# Patient Record
Sex: FEMALE | Race: OTHER | HISPANIC OR LATINO | URBAN - METROPOLITAN AREA
[De-identification: names, ages, dates, MRNs, and addresses within clinical notes are randomized per-mention and may not be internally consistent; named-entity substitution may affect disease eponyms.]

---

## 2019-06-15 ENCOUNTER — OUTPATIENT (OUTPATIENT)
Dept: INPATIENT UNIT | Facility: HOSPITAL | Age: 29
LOS: 1 days | End: 2019-06-15
Payer: SELF-PAY

## 2019-06-15 VITALS
DIASTOLIC BLOOD PRESSURE: 79 MMHG | SYSTOLIC BLOOD PRESSURE: 128 MMHG | TEMPERATURE: 98 F | RESPIRATION RATE: 16 BRPM | HEART RATE: 108 BPM

## 2019-06-15 VITALS
DIASTOLIC BLOOD PRESSURE: 72 MMHG | HEART RATE: 100 BPM | RESPIRATION RATE: 16 BRPM | SYSTOLIC BLOOD PRESSURE: 126 MMHG | TEMPERATURE: 99 F

## 2019-06-15 DIAGNOSIS — O47.03 FALSE LABOR BEFORE 37 COMPLETED WEEKS OF GESTATION, THIRD TRIMESTER: ICD-10-CM

## 2019-06-15 PROCEDURE — 59025 FETAL NON-STRESS TEST: CPT

## 2019-06-15 PROCEDURE — 59025 FETAL NON-STRESS TEST: CPT | Mod: 26

## 2019-06-15 PROCEDURE — G0463: CPT

## 2019-06-15 PROCEDURE — 59050 FETAL MONITOR W/REPORT: CPT

## 2019-06-15 NOTE — OB PROVIDER TRIAGE NOTE - NSHPPHYSICALEXAM_GEN_ALL_CORE
Gen: NAD  ABd: soft, gravid  FH: reactive  [place]: q7-8min contractions  SVE: closed/long/posterior

## 2019-06-15 NOTE — OB PROVIDER TRIAGE NOTE - HISTORY OF PRESENT ILLNESS
27yo  @ 34.6w who comes in with a complaint of contractions that started yesterday but has increased in frequency today to every 10 minutes. Pt describes the pain as a colicky pain that occurs once in a while. Pt denies any vaginal bleeding, loss of fluids. Pt reports +FM. PT denies any sex in the last 48 hours and says she only has had 3 bottles of water a day.

## 2019-06-15 NOTE — OB PROVIDER TRIAGE NOTE - NSOBPROVIDERNOTE_OBGYN_ALL_OB_FT
27yo not in labor. PT likely dehydrated. Counseled on proper PO hydration. Counseled on symptoms which would require a return to triage. Pt understood. Pt to be discharged. Plan/tracing reviewed with Dr. Paredes

## 2023-01-14 NOTE — OB RN TRIAGE NOTE - NS_NUMBOFVISITS_OBGYN_ALL_OB_NU
[FreeTextEntry1] : discussed depression symptoms in detail with patient\par \par wife on zoloft would like to try that for patient first \par discussed AE \par \par will f/u in 4-6 weeks for followup \par referred to  for evaluation. \par pt is deaf, would like in person therapist however most therapists they are finding are only tele-med\par would like assistance finding in person therapist if possible  10

## 2023-08-24 ENCOUNTER — EMERGENCY (EMERGENCY)
Facility: HOSPITAL | Age: 33
LOS: 1 days | Discharge: HOME | End: 2023-08-24
Attending: STUDENT IN AN ORGANIZED HEALTH CARE EDUCATION/TRAINING PROGRAM
Payer: MEDICAID

## 2023-08-24 VITALS
RESPIRATION RATE: 18 BRPM | OXYGEN SATURATION: 98 % | WEIGHT: 141.1 LBS | TEMPERATURE: 98 F | HEART RATE: 82 BPM | SYSTOLIC BLOOD PRESSURE: 124 MMHG | DIASTOLIC BLOOD PRESSURE: 85 MMHG

## 2023-08-24 PROBLEM — O03.9 COMPLETE OR UNSPECIFIED SPONTANEOUS ABORTION WITHOUT COMPLICATION: Chronic | Status: ACTIVE | Noted: 2019-06-15

## 2023-08-24 LAB
ALBUMIN SERPL ELPH-MCNC: 4.2 G/DL — SIGNIFICANT CHANGE UP (ref 3.3–5.2)
ALP SERPL-CCNC: 105 U/L — SIGNIFICANT CHANGE UP (ref 40–120)
ALT FLD-CCNC: 9 U/L — SIGNIFICANT CHANGE UP
ANION GAP SERPL CALC-SCNC: 12 MMOL/L — SIGNIFICANT CHANGE UP (ref 5–17)
APPEARANCE UR: ABNORMAL
AST SERPL-CCNC: 13 U/L — SIGNIFICANT CHANGE UP
BACTERIA # UR AUTO: ABNORMAL
BASOPHILS # BLD AUTO: 0.05 K/UL — SIGNIFICANT CHANGE UP (ref 0–0.2)
BASOPHILS NFR BLD AUTO: 0.7 % — SIGNIFICANT CHANGE UP (ref 0–2)
BILIRUB SERPL-MCNC: 0.3 MG/DL — LOW (ref 0.4–2)
BILIRUB UR-MCNC: NEGATIVE — SIGNIFICANT CHANGE UP
BUN SERPL-MCNC: 9.5 MG/DL — SIGNIFICANT CHANGE UP (ref 8–20)
CALCIUM SERPL-MCNC: 8.8 MG/DL — SIGNIFICANT CHANGE UP (ref 8.4–10.5)
CHLORIDE SERPL-SCNC: 105 MMOL/L — SIGNIFICANT CHANGE UP (ref 96–108)
CO2 SERPL-SCNC: 24 MMOL/L — SIGNIFICANT CHANGE UP (ref 22–29)
COLOR SPEC: YELLOW — SIGNIFICANT CHANGE UP
CREAT SERPL-MCNC: 0.7 MG/DL — SIGNIFICANT CHANGE UP (ref 0.5–1.3)
DIFF PNL FLD: ABNORMAL
EGFR: 119 ML/MIN/1.73M2 — SIGNIFICANT CHANGE UP
EOSINOPHIL # BLD AUTO: 0.04 K/UL — SIGNIFICANT CHANGE UP (ref 0–0.5)
EOSINOPHIL NFR BLD AUTO: 0.5 % — SIGNIFICANT CHANGE UP (ref 0–6)
EPI CELLS # UR: SIGNIFICANT CHANGE UP
GLUCOSE SERPL-MCNC: 72 MG/DL — SIGNIFICANT CHANGE UP (ref 70–99)
GLUCOSE UR QL: NEGATIVE MG/DL — SIGNIFICANT CHANGE UP
HCG SERPL-ACNC: <4 MIU/ML — SIGNIFICANT CHANGE UP
HCT VFR BLD CALC: 41.5 % — SIGNIFICANT CHANGE UP (ref 34.5–45)
HGB BLD-MCNC: 13.6 G/DL — SIGNIFICANT CHANGE UP (ref 11.5–15.5)
IMM GRANULOCYTES NFR BLD AUTO: 0.3 % — SIGNIFICANT CHANGE UP (ref 0–0.9)
KETONES UR-MCNC: NEGATIVE — SIGNIFICANT CHANGE UP
LEUKOCYTE ESTERASE UR-ACNC: ABNORMAL
LIDOCAIN IGE QN: 22 U/L — SIGNIFICANT CHANGE UP (ref 22–51)
LYMPHOCYTES # BLD AUTO: 1.84 K/UL — SIGNIFICANT CHANGE UP (ref 1–3.3)
LYMPHOCYTES # BLD AUTO: 24.6 % — SIGNIFICANT CHANGE UP (ref 13–44)
MCHC RBC-ENTMCNC: 28.6 PG — SIGNIFICANT CHANGE UP (ref 27–34)
MCHC RBC-ENTMCNC: 32.8 GM/DL — SIGNIFICANT CHANGE UP (ref 32–36)
MCV RBC AUTO: 87.4 FL — SIGNIFICANT CHANGE UP (ref 80–100)
MONOCYTES # BLD AUTO: 0.54 K/UL — SIGNIFICANT CHANGE UP (ref 0–0.9)
MONOCYTES NFR BLD AUTO: 7.2 % — SIGNIFICANT CHANGE UP (ref 2–14)
NEUTROPHILS # BLD AUTO: 4.99 K/UL — SIGNIFICANT CHANGE UP (ref 1.8–7.4)
NEUTROPHILS NFR BLD AUTO: 66.7 % — SIGNIFICANT CHANGE UP (ref 43–77)
NITRITE UR-MCNC: NEGATIVE — SIGNIFICANT CHANGE UP
PH UR: 6.5 — SIGNIFICANT CHANGE UP (ref 5–8)
PLATELET # BLD AUTO: 253 K/UL — SIGNIFICANT CHANGE UP (ref 150–400)
POTASSIUM SERPL-MCNC: 3.6 MMOL/L — SIGNIFICANT CHANGE UP (ref 3.5–5.3)
POTASSIUM SERPL-SCNC: 3.6 MMOL/L — SIGNIFICANT CHANGE UP (ref 3.5–5.3)
PROT SERPL-MCNC: 7.2 G/DL — SIGNIFICANT CHANGE UP (ref 6.6–8.7)
PROT UR-MCNC: 15
RBC # BLD: 4.75 M/UL — SIGNIFICANT CHANGE UP (ref 3.8–5.2)
RBC # FLD: 12.9 % — SIGNIFICANT CHANGE UP (ref 10.3–14.5)
RBC CASTS # UR COMP ASSIST: SIGNIFICANT CHANGE UP /HPF (ref 0–4)
SODIUM SERPL-SCNC: 141 MMOL/L — SIGNIFICANT CHANGE UP (ref 135–145)
SP GR SPEC: 1.01 — SIGNIFICANT CHANGE UP (ref 1.01–1.02)
TROPONIN T SERPL-MCNC: <0.01 NG/ML — SIGNIFICANT CHANGE UP (ref 0–0.06)
UROBILINOGEN FLD QL: NEGATIVE MG/DL — SIGNIFICANT CHANGE UP
WBC # BLD: 7.48 K/UL — SIGNIFICANT CHANGE UP (ref 3.8–10.5)
WBC # FLD AUTO: 7.48 K/UL — SIGNIFICANT CHANGE UP (ref 3.8–10.5)
WBC UR QL: ABNORMAL /HPF (ref 0–5)

## 2023-08-24 PROCEDURE — 71045 X-RAY EXAM CHEST 1 VIEW: CPT | Mod: 26

## 2023-08-24 PROCEDURE — 76705 ECHO EXAM OF ABDOMEN: CPT | Mod: 26

## 2023-08-24 PROCEDURE — 36415 COLL VENOUS BLD VENIPUNCTURE: CPT

## 2023-08-24 PROCEDURE — 87086 URINE CULTURE/COLONY COUNT: CPT

## 2023-08-24 PROCEDURE — 85025 COMPLETE CBC W/AUTO DIFF WBC: CPT

## 2023-08-24 PROCEDURE — 99285 EMERGENCY DEPT VISIT HI MDM: CPT

## 2023-08-24 PROCEDURE — 84702 CHORIONIC GONADOTROPIN TEST: CPT

## 2023-08-24 PROCEDURE — 87186 SC STD MICRODIL/AGAR DIL: CPT

## 2023-08-24 PROCEDURE — 84484 ASSAY OF TROPONIN QUANT: CPT

## 2023-08-24 PROCEDURE — 83690 ASSAY OF LIPASE: CPT

## 2023-08-24 PROCEDURE — T1013: CPT

## 2023-08-24 PROCEDURE — 76705 ECHO EXAM OF ABDOMEN: CPT

## 2023-08-24 PROCEDURE — 99285 EMERGENCY DEPT VISIT HI MDM: CPT | Mod: 25

## 2023-08-24 PROCEDURE — 80053 COMPREHEN METABOLIC PANEL: CPT

## 2023-08-24 PROCEDURE — 96375 TX/PRO/DX INJ NEW DRUG ADDON: CPT

## 2023-08-24 PROCEDURE — 96374 THER/PROPH/DIAG INJ IV PUSH: CPT

## 2023-08-24 PROCEDURE — 71045 X-RAY EXAM CHEST 1 VIEW: CPT

## 2023-08-24 PROCEDURE — 81001 URINALYSIS AUTO W/SCOPE: CPT

## 2023-08-24 RX ORDER — MORPHINE SULFATE 50 MG/1
4 CAPSULE, EXTENDED RELEASE ORAL ONCE
Refills: 0 | Status: DISCONTINUED | OUTPATIENT
Start: 2023-08-24 | End: 2023-08-24

## 2023-08-24 RX ORDER — FAMOTIDINE 10 MG/ML
20 INJECTION INTRAVENOUS ONCE
Refills: 0 | Status: COMPLETED | OUTPATIENT
Start: 2023-08-24 | End: 2023-08-24

## 2023-08-24 RX ORDER — CEPHALEXIN 500 MG
1 CAPSULE ORAL
Qty: 14 | Refills: 0
Start: 2023-08-24 | End: 2023-08-30

## 2023-08-24 RX ORDER — PANTOPRAZOLE SODIUM 20 MG/1
1 TABLET, DELAYED RELEASE ORAL
Qty: 14 | Refills: 0
Start: 2023-08-24 | End: 2023-09-06

## 2023-08-24 RX ORDER — CEFTRIAXONE 500 MG/1
1000 INJECTION, POWDER, FOR SOLUTION INTRAMUSCULAR; INTRAVENOUS ONCE
Refills: 0 | Status: COMPLETED | OUTPATIENT
Start: 2023-08-24 | End: 2023-08-24

## 2023-08-24 RX ORDER — CEFTRIAXONE 500 MG/1
1000 INJECTION, POWDER, FOR SOLUTION INTRAMUSCULAR; INTRAVENOUS ONCE
Refills: 0 | Status: DISCONTINUED | OUTPATIENT
Start: 2023-08-24 | End: 2023-08-24

## 2023-08-24 RX ORDER — FAMOTIDINE 10 MG/ML
20 INJECTION INTRAVENOUS ONCE
Refills: 0 | Status: DISCONTINUED | OUTPATIENT
Start: 2023-08-24 | End: 2023-08-24

## 2023-08-24 RX ADMIN — Medication 30 MILLILITER(S): at 14:32

## 2023-08-24 RX ADMIN — CEFTRIAXONE 1000 MILLIGRAM(S): 500 INJECTION, POWDER, FOR SOLUTION INTRAMUSCULAR; INTRAVENOUS at 15:28

## 2023-08-24 RX ADMIN — FAMOTIDINE 20 MILLIGRAM(S): 10 INJECTION INTRAVENOUS at 14:32

## 2023-08-24 RX ADMIN — MORPHINE SULFATE 4 MILLIGRAM(S): 50 CAPSULE, EXTENDED RELEASE ORAL at 15:58

## 2023-08-24 NOTE — ED STATDOCS - PATIENT PORTAL LINK FT
You can access the FollowMyHealth Patient Portal offered by [facility] by registering at the following website: [email or web address]. By joining [employer]’s FollowMyHealth portal, you will also be able to view your health information using other applications (apps) compatible with our system.

## 2023-08-24 NOTE — ED STATDOCS - CLINICAL SUMMARY MEDICAL DECISION MAKING FREE TEXT BOX
35 y/o female p/w epigastric pain, sometimes gets chest pain at times feels like burning pain but a few days ago felt like a punch. Denies any fever but had chills last night. By hx sounds like reflux, on exam has epigastric pain suspect Gastritis w/ reflux. DDx includes pancreatitis vs atypical ACS, vs PNA. Will check labs, CXR, EKG, start symptomatic treatment w/ medication, f/u studies, reassess and dispo pending clinical course. 33 y/o female p/w epigastric pain, sometimes gets chest pain at times feels like burning pain but a few days ago felt like a punch. Denies any fever but had chills last night. By hx sounds like reflux, on exam has epigastric pain suspect Gastritis w/ reflux. DDx includes pancreatitis vs atypical ACS, vs PNA. Will check labs, CXR, EKG, start symptomatic treatment w/ medication, f/u studies, reassess and dispo pending clinical course.    no evidenc eof PNA, us with no acute cholelithiais or cholecysitits, pt found to have uti, oferred ct scan for further evlauaiton but pt notes she is feeling better, Pt reassessed, pt feeling better at this time, vss, pt able to walk, talk and vocalized plan of action. Discussed in depth and explained to pt in depth the next steps that need to be taking including proper follow up with PCP or specialists. All incidental findings were discussed with pt as well. Pt verbalized their concerns and all questions were answered. Pt understands dispo and wants discharge. Given good instructions when to return to ED and importance of f/u.

## 2023-08-24 NOTE — ED STATDOCS - OBJECTIVE STATEMENT
32 y/o female with no pmhx, c/o abdominal pain started today with chills, nausea and diarrhea. Denies fever or vomiting. No constipation. Denies leg swelling or pain. No hematuria. Pt states had dysuria 2 days ago which resolved on its own. Pt also c/o intermitted episodes of palpitations. LMP was 1 month ago. Denies hx of endometriosis. Pt also noticed getting random bruises. Pt a few days ago had abdominal pain radiating up chest pain which felt like someone punch her. Pt mentions recent change in diet and not eating enough.   Ann-Marie

## 2023-08-24 NOTE — ED ADULT NURSE NOTE - OBJECTIVE STATEMENT
Pt is A&Ox3. Respirations are even and unlabored. Color is appropriate for race. Skin warm and dry. Pt reports abdominal pain 8/10 that started today with chills, nausea, and diarrhea. Pt denies vomiting and fever. Pt reports dysuria that started 2 days ago and resolved on its own. Pt also reports palpations that are on and off and  radiating chest pain. ED MD evaluating, plan of care ongoing.

## 2023-08-24 NOTE — ED ADULT NURSE NOTE - NSFALLUNIVINTERV_ED_ALL_ED
WDL Bed/Stretcher in lowest position, wheels locked, appropriate side rails in place/Call bell, personal items and telephone in reach/Instruct patient to call for assistance before getting out of bed/chair/stretcher/Non-slip footwear applied when patient is off stretcher/[place] to call system/Physically safe environment - no spills, clutter or unnecessary equipment/Purposeful proactive rounding/Room/bathroom lighting operational, light cord in reach

## 2023-08-24 NOTE — ED STATDOCS - PHYSICAL EXAMINATION
Gen: NAD, non-toxic, conversational  Eyes: PERRLA, EOMI   HENT: Normocephalic, atraumatic. External ears normal, no rhinorrhea, moist mucous membranes.   CV: RRR, no M/R/G  Resp: CTAB, non-labored, speaking without difficulty on room air  Abd: soft, TTP @epigastric, -Ly's, non rigid, no guarding or rebound tenderness  Back: No CVAT bilaterally, no midline ttp  Skin: dry, wwp   Neuro: AOx3, speech is fluent and appropriate  Psych: Mood ok, affect euthymic

## 2023-08-24 NOTE — ED STATDOCS - NSFOLLOWUPINSTRUCTIONS_ED_ALL_ED_FT
Urinary Tract Infection    A urinary tract infection (UTI) is an infection of any part of the urinary tract, which includes the kidneys, ureters, bladder, and urethra. Risk factors include ignoring your need to urinate, wiping back to front if female, being an uncircumcised male, and having diabetes or a weak immune system. Symptoms include frequent urination, pain or burning with urination, foul smelling urine, cloudy urine, pain in the lower abdomen, blood in the urine, and fever. If you were prescribed an antibiotic medicine, take it as told by your health care provider. Do not stop taking the antibiotic even if you start to feel better.    SEEK IMMEDIATE MEDICAL CARE IF YOU HAVE ANY OF THE FOLLOWING SYMPTOMS: severe back or abdominal pain, fever, inability to keep fluids or medicine down, dizziness/lightheadedness, or a change in mental status.     Abdominal Pain    Many things can cause abdominal pain. Many times, abdominal pain is not caused by a disease and will improve without treatment. Your health care provider will do a physical exam to determine if there is a dangerous cause of your pain; blood tests and imaging may help determine the cause of your pain. However, in many cases, no cause may be found and you may need further testing as an outpatient. Monitor your abdominal pain for any changes.     SEEK IMMEDIATE MEDICAL CARE IF YOU HAVE ANY OF THE FOLLOWING SYMPTOMS: worsening abdominal pain, uncontrollable vomiting, profuse diarrhea, inability to have bowel movements or pass gas, black or bloody stools, fever accompanying chest pain or back pain, or fainting. These symptoms may represent a serious problem that is an emergency. Do not wait to see if the symptoms will go away. Get medical help right away. Call 911 and do not drive yourself to the hospital. Infección del tracto urinario    Bi infección del tracto urinario (ITU) es bi infección de cualquier parte del tracto urinario, que incluye los riñones, los uréteres, la vejiga y la uretra. Los factores de riesgo incluyen ignorar la necesidad de orinar, limpiarse de atrás hacia adelante si es bautista, ser un hombre no circuncidado y tener diabetes o un sistema inmunológico débil. Los síntomas incluyen micción frecuente, dolor o ardor al orinar, orina con mal olor, orina turbia, dolor en la parte inferior del abdomen, gabriella en la orina y fiebre. Si le recetaron un antibiótico, tómelo según las indicaciones de garland proveedor de atención médica. No deje de phuc el antibiótico incluso si comienza a sentirse mejor.    BUSQUE ATENCIÓN MÉDICA INMEDIATA SI TIENE ALGUNO DE LOS SIGUIENTES SÍNTOMAS: dolor intenso de espalda o abdominal, fiebre, incapacidad para retener líquidos o medicamentos, mareos/aturdimiento o un cambio en el estado mental.    Dolor abdominal    Muchas cosas pueden causar dolor abdominal. Muchas veces, el dolor abdominal no es causado por bi enfermedad y mejorará sin tratamiento. Garland proveedor de atención médica le realizará un examen físico para determinar si existe bi causa peligrosa de garland dolor; Los análisis de gabriella y las imágenes pueden ayudar a determinar la causa de garland dolor. Sin embargo, en muchos casos, es posible que no se encuentre ninguna causa y es posible que necesite más pruebas taj paciente ambulatorio. Controle garland dolor abdominal para detectar cualquier cambio.    BUSQUE ATENCIÓN MÉDICA INMEDIATA SI TIENE ALGUNO DE LOS SIGUIENTES SÍNTOMAS: empeoramiento del dolor abdominal, vómitos incontrolables, diarrea profusa, incapacidad para defecar o expulsar gases, heces negras o con gabriella, fiebre que acompaña al dolor de pecho o de espalda, o desmayos. Estos síntomas pueden representar un problema grave que constituye bi emergencia. No espere a chris si los síntomas desaparecen. Obtenga ayuda médica de inmediato. Llame al 911 y no conduzca hasta el hospital.

## 2023-09-04 ENCOUNTER — EMERGENCY (EMERGENCY)
Facility: HOSPITAL | Age: 33
LOS: 1 days | Discharge: HOME | End: 2023-09-04
Attending: EMERGENCY MEDICINE
Payer: MEDICAID

## 2023-09-04 VITALS
SYSTOLIC BLOOD PRESSURE: 122 MMHG | TEMPERATURE: 98 F | DIASTOLIC BLOOD PRESSURE: 87 MMHG | HEART RATE: 71 BPM | WEIGHT: 160.5 LBS | RESPIRATION RATE: 16 BRPM | OXYGEN SATURATION: 99 %

## 2023-09-04 LAB
ALBUMIN SERPL ELPH-MCNC: 4.1 G/DL — SIGNIFICANT CHANGE UP (ref 3.3–5.2)
ALP SERPL-CCNC: 104 U/L — SIGNIFICANT CHANGE UP (ref 40–120)
ALT FLD-CCNC: 10 U/L — SIGNIFICANT CHANGE UP
ANION GAP SERPL CALC-SCNC: 11 MMOL/L — SIGNIFICANT CHANGE UP (ref 5–17)
AST SERPL-CCNC: 15 U/L — SIGNIFICANT CHANGE UP
BASOPHILS # BLD AUTO: 0.04 K/UL — SIGNIFICANT CHANGE UP (ref 0–0.2)
BASOPHILS NFR BLD AUTO: 0.5 % — SIGNIFICANT CHANGE UP (ref 0–2)
BILIRUB SERPL-MCNC: 0.2 MG/DL — LOW (ref 0.4–2)
BUN SERPL-MCNC: 9.7 MG/DL — SIGNIFICANT CHANGE UP (ref 8–20)
CALCIUM SERPL-MCNC: 9.1 MG/DL — SIGNIFICANT CHANGE UP (ref 8.4–10.5)
CHLORIDE SERPL-SCNC: 103 MMOL/L — SIGNIFICANT CHANGE UP (ref 96–108)
CO2 SERPL-SCNC: 24 MMOL/L — SIGNIFICANT CHANGE UP (ref 22–29)
CREAT SERPL-MCNC: 0.54 MG/DL — SIGNIFICANT CHANGE UP (ref 0.5–1.3)
EGFR: 126 ML/MIN/1.73M2 — SIGNIFICANT CHANGE UP
EOSINOPHIL # BLD AUTO: 0.02 K/UL — SIGNIFICANT CHANGE UP (ref 0–0.5)
EOSINOPHIL NFR BLD AUTO: 0.3 % — SIGNIFICANT CHANGE UP (ref 0–6)
GLUCOSE SERPL-MCNC: 93 MG/DL — SIGNIFICANT CHANGE UP (ref 70–99)
HCG SERPL-ACNC: <4 MIU/ML — SIGNIFICANT CHANGE UP
HCT VFR BLD CALC: 41.1 % — SIGNIFICANT CHANGE UP (ref 34.5–45)
HGB BLD-MCNC: 13.1 G/DL — SIGNIFICANT CHANGE UP (ref 11.5–15.5)
IMM GRANULOCYTES NFR BLD AUTO: 0.3 % — SIGNIFICANT CHANGE UP (ref 0–0.9)
LIDOCAIN IGE QN: 14 U/L — LOW (ref 22–51)
LYMPHOCYTES # BLD AUTO: 2.03 K/UL — SIGNIFICANT CHANGE UP (ref 1–3.3)
LYMPHOCYTES # BLD AUTO: 27.4 % — SIGNIFICANT CHANGE UP (ref 13–44)
MCHC RBC-ENTMCNC: 28.1 PG — SIGNIFICANT CHANGE UP (ref 27–34)
MCHC RBC-ENTMCNC: 31.9 GM/DL — LOW (ref 32–36)
MCV RBC AUTO: 88.2 FL — SIGNIFICANT CHANGE UP (ref 80–100)
MONOCYTES # BLD AUTO: 0.33 K/UL — SIGNIFICANT CHANGE UP (ref 0–0.9)
MONOCYTES NFR BLD AUTO: 4.4 % — SIGNIFICANT CHANGE UP (ref 2–14)
NEUTROPHILS # BLD AUTO: 4.98 K/UL — SIGNIFICANT CHANGE UP (ref 1.8–7.4)
NEUTROPHILS NFR BLD AUTO: 67.1 % — SIGNIFICANT CHANGE UP (ref 43–77)
PLATELET # BLD AUTO: 281 K/UL — SIGNIFICANT CHANGE UP (ref 150–400)
POTASSIUM SERPL-MCNC: 3.9 MMOL/L — SIGNIFICANT CHANGE UP (ref 3.5–5.3)
POTASSIUM SERPL-SCNC: 3.9 MMOL/L — SIGNIFICANT CHANGE UP (ref 3.5–5.3)
PROT SERPL-MCNC: 7.4 G/DL — SIGNIFICANT CHANGE UP (ref 6.6–8.7)
RBC # BLD: 4.66 M/UL — SIGNIFICANT CHANGE UP (ref 3.8–5.2)
RBC # FLD: 12.9 % — SIGNIFICANT CHANGE UP (ref 10.3–14.5)
SODIUM SERPL-SCNC: 138 MMOL/L — SIGNIFICANT CHANGE UP (ref 135–145)
WBC # BLD: 7.42 K/UL — SIGNIFICANT CHANGE UP (ref 3.8–10.5)
WBC # FLD AUTO: 7.42 K/UL — SIGNIFICANT CHANGE UP (ref 3.8–10.5)

## 2023-09-04 PROCEDURE — 84702 CHORIONIC GONADOTROPIN TEST: CPT

## 2023-09-04 PROCEDURE — T1013: CPT

## 2023-09-04 PROCEDURE — 83690 ASSAY OF LIPASE: CPT

## 2023-09-04 PROCEDURE — 99284 EMERGENCY DEPT VISIT MOD MDM: CPT | Mod: 25

## 2023-09-04 PROCEDURE — 80053 COMPREHEN METABOLIC PANEL: CPT

## 2023-09-04 PROCEDURE — 85025 COMPLETE CBC W/AUTO DIFF WBC: CPT

## 2023-09-04 PROCEDURE — 99284 EMERGENCY DEPT VISIT MOD MDM: CPT

## 2023-09-04 PROCEDURE — 36415 COLL VENOUS BLD VENIPUNCTURE: CPT

## 2023-09-04 PROCEDURE — 96374 THER/PROPH/DIAG INJ IV PUSH: CPT

## 2023-09-04 RX ORDER — SUCRALFATE 1 G
1 TABLET ORAL ONCE
Refills: 0 | Status: COMPLETED | OUTPATIENT
Start: 2023-09-04 | End: 2023-09-04

## 2023-09-04 RX ORDER — FAMOTIDINE 10 MG/ML
20 INJECTION INTRAVENOUS ONCE
Refills: 0 | Status: COMPLETED | OUTPATIENT
Start: 2023-09-04 | End: 2023-09-04

## 2023-09-04 RX ORDER — OMEPRAZOLE 10 MG/1
1 CAPSULE, DELAYED RELEASE ORAL
Qty: 28 | Refills: 0
Start: 2023-09-04 | End: 2023-09-17

## 2023-09-04 RX ADMIN — FAMOTIDINE 20 MILLIGRAM(S): 10 INJECTION INTRAVENOUS at 20:03

## 2023-09-04 RX ADMIN — Medication 1 GRAM(S): at 20:03

## 2023-09-04 NOTE — ED STATDOCS - NSFOLLOWUPINSTRUCTIONS_ED_ALL_ED_FT
Dolor abdominal en adultos      El dolor abdominal puede tener muchas causas. A menudo, no es grave y mejora sin tratamiento o con tratamiento en la casa. Sin embargo, a veces el dolor abdominal es intenso. El médico revisará edith antecedentes médicos y le hará un examen físico para tratar de determinar la causa del dolor abdominal.    Siga estas instrucciones en garland casa:  [place] los medicamentos de venta jean y los recetados solamente taj se lo haya indicado el médico. No tome un laxante a menos que se lo haya indicado el médico.  [place]ottie suficiente líquido para mantener la orina nakia o de color amarillo pálido.   Controle garland afección para chris si hay cambios.  Concurra a todas las visitas de control taj se lo haya indicado el médico. [place] es importante.    Comuníquese con un médico si:  El dolor abdominal cambia o empeora.  No tiene apetito o baja de peso sin proponérselo.  Está estreñido o tiene diarrea kathie más de 2 o 3 fermin.  Tiene dolor cuando orina o defeca.  El dolor abdominal lo despierta de noche.  El dolor empeora con las comidas, después de comer o con determinados alimentos.  Tiene vómitos y no puede retener nada.  Tiene fiebre.    Solicite ayuda de inmediato si:  El dolor no desaparece tan pronto taj el médico le dijo que era esperable.  No puede detener los vómitos.  El dolor se siente solo en zonas del abdomen, taj el lado derecho o la parte inferior izquierda del abdomen.  Las heces son sanguinolentas o de color lilia, o de aspecto alquitranado.  Tiene dolor intenso, cólicos, o meteorismo en el abdomen.  Tiene signos de deshidratación, por ejemplo:  Orina oscura, muy escasa o falta de orina.  Labios agrietados.  Boca seca.  Ojos hundidos.  Somnolencia.  Debilidad.    NOTAS ADICIONALES E INSTRUCCIONES    Por favor tenga seguimiento con garland doctor primario entre 2 brown.  Regrese a urgencias por cualquier preocupacion medica. - Prescription sent to pharmacy.  - Increase fluids.  - [place] diet as tolerated. Avoid citrus based food/drink, spicy/greasy foods, milk, caffeine.   - Please call tomorrow to schedule follow up appointment with gastroenterologist.   - Please call to schedule follow up appointment with your primary care physician within 24-48 hours.  - Please seek immediate medical attention for any new/worsening, signs/symptoms, or concerns.    Feel better!    - Receta enviada a farmacia.  - Incrementar los líquidos.  - Dieta blanda según la tolerancia. Evite los alimentos / bebidas a base de cítricos, los alimentos picantes / grasosos, la leche y la cafeína.  - Llame mañana para programar bi baljeet de seguimiento con el gastroenterólogo.  - Llame para programar bi baljeet de seguimiento con josé médico de atención primaria dentro de las 24 a 48 horas.  - Busque atención médica inmediata ante cualquier nuevo / empeoramiento, signo / síntoma o inquietud.    ¡Sentirse mejor!    Dolor abdominal mario    LO QUE NECESITA SABER:    ¿Qué necesito saber del dolor abdominal mario?Generalmente, el dolor abdominal mario comienza repentinamente y empeora rápidamente.    ¿Cuáles son las causas menores del dolor abdominal mario?  •Bi reacción alérgica a alimentos o intoxicación por alimentos      •Estrés      •Reflujo gástrico      •Estreñimiento      •Dolor del periodo menstrual en las mujeres      ¿Cuáles son las causas graves del dolor abdominal mario?  •Inflamación o ruptura de josé apéndice      •Inflamación o infección en el abdomen o un órgano      •Bi obstrucción en los intestinos      •Bi úlcera o un desgarre en el esófago, el estómago o los intestinos      •Sangrado en el abdomen o un órgano      •Cálculos en el riñón o la vesícula biliar      •Enfermedades de las trompas de [place] o los ovarios      •Un embarazo ectópico      ¿Cómo se diagnostica la causa del dolor abdominal mario?José médico le preguntará acerca de nilam signos y síntomas. Informe al médico cuándo comenzaron nilam síntomas y sobre cualquier viaje o cirugía recientes. También infórmele qué hace que el dolor mejore o empeore, y qué tratamientos lino probado. El médico lo examinará. Con base en nilam síntomas y en lo que encuentre el médico en el examen, es posible que deba realizarse otros exámenes. Los ejemplos incluyen exámenes de gabriella o de orina, ecografías, tomografías computarizadas o endoscopías.    ¿Cómo se trata el dolor abdominal mario?El tratamiento podría depender de la causa del dolor abdominal. Es posible que usted necesite alguno de los siguientes:   •Los medicamentosestos pueden administrarse para disminuir el dolor, tratar bi infección y manejar nilam síntomas, tales taj el estreñimiento.      •La cirugíapuede necesitarse para tratar causas graves del dolor abdominal. Los ejemplos incluyen cirugías para tratar bi apendicitis o bi obstrucción en los intestinos.      ¿Cómo puedo controlar los síntomas?  •Aplique calorsobre el abdomen de 20 a 30 minutos cada 2 horas por los días que le indiquen. El calor ayuda a disminuir el dolor y los espasmos musculares.      •Realice cambios en los alimentos que consume según se le indique.No coma alimentos que causan dolor abdominal u otros síntomas. Ingiera comidas pequeñas, más a menudo. ?Coma más alimentos ricos en fibra si tiene estreñimiento. Los alimentos altos en fibra incluyen frutas, verduras, alimentos de grano integral y legumbres.      ?No coma alimentos que causan gas si tiene distensión. Por ejemplo, brócoli, col y coliflor. No sandrine gaseosas o bebidas carbonadas, ya que también pueden provocarle gases.      ?No consuma alimentos o bebidas que contienen sorbitol o fructosa si tiene diarrea y distensión. Algunos ejemplos son jugos de frutas, dulces, mermeladas y gomas de mascar sin azúcar.      ?No coma alimentos altos en grasas, taj comidas fritas, hamburguesas con queso, salchichas y postres.      ?Limite o no tome cafeína. La cafeína puede empeorar los síntomas, taj la acidez o las náuseas.      ?Sandrine suficientes líquidos para evitar la deshidratación causada por la diarrea o los vómitos. Pregunte a josé médico sobre la cantidad de líquido que necesita phuc todos los fermin y cuáles le recomienda.      •Controle josé estrés.El estrés puede causar dolor abdominal. José médico puede recomendarle técnicas de relajación y ejercicios de respiración profunda para ayudar a disminuir el estrés. José médico puede recomendarle que hable con alguien sobre josé estrés o ansiedad, taj un consejero o un amigo de confianza. [place] lo suficiente y realice ejercicio regularmente.      •Limite o no consuma bebidas alcohólicas.El alcohol puede empeorar el dolor abdominal. Pregunte a josé médico si usted puede phuc alcohol. Pregunte cuanto es la cantidad arce para usted phuc.      •No fume.La nicotina y otros químicos en los cigarrillos pueden dañarle el esófago y el estómago. Pida información a josé médico si usted actualmente fuma y necesita ayuda para dejar de fumar. Los cigarrillos electrónicos o el tabaco sin humo igualmente contienen nicotina. Consulte con josé médico antes de utilizar estos productos.      ¿Cuándo gunnar buscar atención inmediata?  •Usted no puede dejar de vomitar o vomita gabriella.      •Usted tiene gabriella en las evacuaciones intestinales o estas tienen un aspecto alquitranado.      •Usted tiene sangrado por josé recto.      •El tamaño del abdomen es más nick de lo normal y se siente kylah y más doloroso.      •Tiene dolor abdominal intenso.      •Usted elizabeth de tener flatulencias y evacuaciones intestinales.      •Usted se siente mareado, débil o tiene sensación de desmayo.      ¿Cuándo gunnar comunicarme con mi médico?  •Tiene fiebre.      •Tiene nuevos signos y síntomas.      •Nilam síntomas no mejoran con el tratamiento.      •Usted tiene preguntas o inquietudes acerca de josé condición o cuidado.      ACUERDOS SOBRE JOSÉ CUIDADO:    Usted tiene el derecho de ayudar a planear josé cuidado. Aprenda todo lo que pueda sobre josé condición y taj darle tratamiento. Discuta nilam opciones de tratamiento con nilam médicos para decidir el cuidado que usted desea recibir. Usted siempre tiene el derecho de rechazar el tratamiento.

## 2023-09-04 NOTE — ED STATDOCS - ATTENDING APP SHARED VISIT CONTRIBUTION OF CARE
I, Carlos Mccord, performed the initial face to face bedside interview with this patient regarding history of present illness, review of symptoms and relevant past medical, social and family history.  I completed an independent physical examination.  I was the initial provider who evaluated this patient. I have signed out the follow up of any pending tests (i.e. labs, radiological studies) to the [facility].  I have communicated the patient’s plan of care and disposition with the [facility].

## 2023-09-04 NOTE — ED STATDOCS - CLINICAL SUMMARY MEDICAL DECISION MAKING FREE TEXT BOX
pt with epigastric abdominal pain for 11 days, seen on 8/24 with unremarkable work-up, will repeat labs, give Carafate and Pepcid, GI follow-up pt with epigastric abdominal pain for 11 days, seen on 8/24 with unremarkable work-up, will repeat labs, give Carafate and Pepcid, GI follow-up    MAINOR Samuel 2049: Unremarkable work up. Prescribed omeprazole. Follow up with GI. Medically stable for discharge.

## 2023-09-04 NOTE — ED STATDOCS - PATIENT PORTAL LINK FT
You can access the FollowMyHealth Patient Portal offered by [facility] by registering at the following website: [email or web address]. By joining [employer]’s FollowMyHealth portal, you will also be able to view your health information using other applications (apps) compatible with our system.

## 2023-09-04 NOTE — ED STATDOCS - PROGRESS NOTE DETAILS
MAINOR Samuel: Patient evaluated by intake physician. HPI/ROS/PE as noted above. Will follow up plan per intake physician and continue to assess patient.

## 2023-09-04 NOTE — ED ADULT NURSE NOTE - HOW OFTEN DO YOU HAVE A DRINK CONTAINING ALCOHOL?
Refill request received, approved x 1 month only and Eprescribed to pharmacy via [facility] system.  Patient is due for follow up appointment.    Last visit: 5/23/16  Pending apt: None    
Never

## 2023-09-04 NOTE — ED ADULT TRIAGE NOTE - CHIEF COMPLAINT QUOTE
Patient c/o abdominal pain x10 days. States she was here on 8/24. Denies N/V/D. States it feels as though there is a hole in her stomach.

## 2023-09-04 NOTE — ED STATDOCS - NS ED ROS FT
Review of Systems  •	CONSTITUTIONAL - no  fever, no diaphoresis, no weight change  •	SKIN - no rash  •	HEMATOLOGIC - no bleeding, no bruising  •	EYES - no eye pain, no blurred vision  •	ENT - no change in hearing, no pain  •	RESPIRATORY - no shortness of breath, no cough  •	CARDIAC - no chest pain, no palpitations  •	GI - + abd pain, no nausea, no vomiting, no diarrhea, no constipation, no bleeding  •	GENITO-URINARY - no discharge, no dysuria; no hematuria,   •	ENDO - no polydipsia, no polyuria, no heat/no cold intolerance  •	MUSCULOSKELETAL - no joint pain, no swelling, no redness  •	NEUROLOGIC - no weakness, + headache, no anesthesia, no paresthesias  •	PSYCH - no anxiety, non suicidal, non homicidal, no hallucination, no depression

## 2023-09-04 NOTE — ED ADULT NURSE NOTE - OBJECTIVE STATEMENT
pt is a 33y female aox4, c/o abdominal pain for 10 days.  denies nausea, vomiting, diarrhea.  reports that she feels burning in her stomach.  no s/s acute distress noted.

## 2023-09-04 NOTE — ED STATDOCS - CARE PROVIDER_API CALL
Denise Whitney  Gastroenterology  [address] [facility], [address] [address]  Phone: [phone]  Fax: [phone]  Follow Up Time:

## 2023-09-04 NOTE — ED STATDOCS - PHYSICAL EXAMINATION
VITAL SIGNS: I have reviewed nursing notes and confirm.  CONSTITUTIONAL:  in no acute distress.  SKIN: Skin exam is warm and dry, no acute rash.  HEAD: Normocephalic; atraumatic.  EYES: PERRL, EOM intact; conjunctiva and sclera clear.  ENT: No nasal discharge; airway clear. Throat clear.  NECK: Supple; non tender.    CARD: Regular rate and rhythm.  RESP: No wheezes,  no rales or rhonchi.   ABD:  soft; non-distended; + epigastric pain   EXT: Normal ROM. No clubbing, cyanosis or edema.  NEURO: Alert, oriented. Grossly unremarkable. No focal deficits.  moves all extremities,  normal gait   PSYCH: Cooperative, appropriate.

## 2023-09-07 ENCOUNTER — EMERGENCY (EMERGENCY)
Facility: HOSPITAL | Age: 33
LOS: 1 days | Discharge: HOME | End: 2023-09-07
Attending: EMERGENCY MEDICINE
Payer: MEDICAID

## 2023-09-07 VITALS
WEIGHT: 154.1 LBS | RESPIRATION RATE: 18 BRPM | DIASTOLIC BLOOD PRESSURE: 89 MMHG | SYSTOLIC BLOOD PRESSURE: 130 MMHG | HEIGHT: 63 IN | TEMPERATURE: 99 F | OXYGEN SATURATION: 99 % | HEART RATE: 88 BPM

## 2023-09-07 PROCEDURE — 99284 EMERGENCY DEPT VISIT MOD MDM: CPT

## 2023-09-07 RX ORDER — IBUPROFEN 200 MG
600 TABLET ORAL ONCE
Refills: 0 | Status: COMPLETED | OUTPATIENT
Start: 2023-09-07 | End: 2023-09-07

## 2023-09-07 RX ORDER — METHOCARBAMOL 500 MG/1
1500 TABLET, FILM COATED ORAL ONCE
Refills: 0 | Status: COMPLETED | OUTPATIENT
Start: 2023-09-07 | End: 2023-09-07

## 2023-09-07 RX ORDER — LIDOCAINE 4 G/100G
1 CREAM TOPICAL ONCE
Refills: 0 | Status: COMPLETED | OUTPATIENT
Start: 2023-09-07 | End: 2023-09-07

## 2023-09-07 NOTE — ED STATDOCS - PHYSICAL EXAMINATION
Head: atraumatic, normacephalic  Face: atraumatic, no crepitus no orbiral/maxillary/mandibular ttp  throat: uvula midline no exudates  eyes: perrla eomi  heart: rrr s1s2  lungs: ctab  abd: soft, nt nd +bs no rebound/guarding no cva ttp  skin: warm  LE: no swelling, no calf ttp, full range of motion , 5/5 strength   back: reproducible  right lower back pain,

## 2023-09-07 NOTE — ED STATDOCS - CLINICAL SUMMARY MEDICAL DECISION MAKING FREE TEXT BOX
34 y/o female with no PMHx of c/o pain in the back that started today. Possible MSK pain, will check UA, pain control, and  reassess. Less likely kidney stone due to pain reproducible pain. 32 y/o female with no PMHx of c/o pain in the back that started today. Possible MSK pain, will check UA, pain control, and  reassess. Less likely kidney stone due to pain reproducible pain.    +UA    meds, out patient follow up    Pt reassessed, pt feeling better at this time, vss, pt able to walk, talk and vocalized plan of action. Discussed in depth and explained to pt in depth the next steps that need to be taken including proper follow up with PCP or specialists. All incidental findings were discussed with pt as well. Pt verbalized their concerns and all questions were answered. Pt understands dispo and wants discharge. Given good instructions when to return to ED, strict return precautions and importance of f/u. 34 y/o female with no PMHx of c/o pain in the back that started today. Possible MSK pain - reproducible, will check UA, pain control, and  reassess. Less likely kidney stone due to pain reproducible pain.    +UA    meds, out patient follow up    Pt reassessed, pt feeling better at this time, vss, pt able to walk, talk and vocalized plan of action. Discussed in depth and explained to pt in depth the next steps that need to be taken including proper follow up with PCP or specialists. All incidental findings were discussed with pt as well. Pt verbalized their concerns and all questions were answered. Pt understands dispo and wants discharge. Given good instructions when to return to ED, strict return precautions and importance of f/u.

## 2023-09-07 NOTE — ED STATDOCS - ATTENDING APP SHARED VISIT CONTRIBUTION OF CARE
I, Alexia Grady, performed the initial face to face bedside interview with this patient regarding history of present illness, review of symptoms and relevant past medical, social and family history.  I completed an independent physical examination.  I was the initial provider who evaluated this patient. I have signed out the follow up of any pending tests (i.e. labs, radiological studies) to the ACP.  I have communicated the patient’s plan of care and disposition with the ACP.

## 2023-09-07 NOTE — ED STATDOCS - DISPOSITION TYPE
[>50% of Time Spent on Counseling and Coordination of Care for  ___] : Greater than 50% of the encounter time was spent on counseling and coordination of care for [unfilled] DISCHARGE

## 2023-09-07 NOTE — ED STATDOCS - PATIENT PORTAL LINK FT
You can access the FollowMyHealth Patient Portal offered by [facility] by registering at the following website: [email or web address]. By joining [facility]’s FollowMyHealth portal, you will also be able to view your health information using other applications (apps) compatible with our system.

## 2023-09-07 NOTE — ED STATDOCS - OBJECTIVE STATEMENT
ED  Perez     32 y/o female with no PMHx of c/o pain in the back that started today. Pt states she was laying down when the pain started. Pt denies lifting anything heavy. Pt states this pain has happened before, that it comes and goes. PT states history of kidney problems in her 20's. Pt states she has chills and headache. Pt has pain in the  back and right side  Pt denies blood in urine. Pt gets this pain when she doesn't drink enough water. Pt states she gets this pain once a month LMP 2 days ago and has ended. Pt was last seen her on September 4 for abdominal pain    Denies f/n/v/cp/sob/palpitations/cough/rash/dizziness/abd.pain/d/c/dysuria/hematuria ED  Perez     34 y/o female with no PMHx of c/o pain in the back that started today. Pt states she was laying down when the pain started. Pt denies lifting anything heavy. Pt states this pain has happened before, that it comes and goes. PT states history of kidney problems in her 20's . Pt states she has chills and headache. Pt has pain in the  back and right side  Pt denies blood in urine. Pt gets this pain when she doesn't drink enough water. Pt states she gets this pain once a month LMP 2 days ago and has ended. Pt was last seen her on September 4 for abdominal pain inn the epigastric region but that was different    Denies f/n/v/cp/sob/palpitations/cough/rash/dizziness/abd.pain/d/c/dysuria/hematuria

## 2023-09-07 NOTE — ED STATDOCS - NSFOLLOWUPINSTRUCTIONS_ED_ALL_ED_FT
- Seguimiento con atención primaria.    Infección del tracto urinario    Bi infección del tracto urinario (ITU) es bi infección de cualquier parte del tracto urinario, que incluye los riñones, los uréteres, la vejiga y la uretra. Los factores de riesgo incluyen ignorar la necesidad de orinar, limpiarse de atrás hacia adelante si es bautista, ser un hombre no circuncidado y tener diabetes o un sistema inmunológico débil. Los síntomas incluyen micción frecuente, dolor o ardor al orinar, orina con mal olor, orina turbia, dolor en la parte inferior del abdomen, gabriella en la orina y fiebre. Si le recetaron un antibiótico, tómelo según las indicaciones de garland proveedor de atención médica. No deje de phuc el antibiótico incluso si comienza a sentirse mejor.    BUSQUE ATENCIÓN MÉDICA INMEDIATA SI TIENE ALGUNO DE LOS SIGUIENTES SÍNTOMAS: dolor intenso de espalda o abdominal, fiebre, incapacidad para retener líquidos o medicamentos, mareos/aturdimiento o un cambio en el estado mental.    - Follow up with primary care    Urinary Tract Infection    A urinary tract infection (UTI) is an infection of any part of the urinary tract, which includes the kidneys, ureters, bladder, and urethra. Risk factors include ignoring your need to urinate, wiping back to front if female, being an uncircumcised male, and having diabetes or a weak immune system. Symptoms include frequent urination, pain or burning with urination, foul smelling urine, cloudy urine, pain in the lower abdomen, blood in the urine, and fever. If you were prescribed an antibiotic medicine, take it as told by your health care provider. Do not stop taking the antibiotic even if you start to feel better.    SEEK IMMEDIATE MEDICAL CARE IF YOU HAVE ANY OF THE FOLLOWING SYMPTOMS: severe back or abdominal pain, fever, inability to keep fluids or medicine down, dizziness/lightheadedness, or a change in mental status.

## 2023-09-07 NOTE — ED ADULT TRIAGE NOTE - CHIEF COMPLAINT QUOTE
Patient presents to ED with c/o b/l  lower back pain/flank pain and headache since 1400 today.  No meds PTA  Denies trauma/injury

## 2023-09-08 VITALS
OXYGEN SATURATION: 99 % | HEART RATE: 66 BPM | DIASTOLIC BLOOD PRESSURE: 85 MMHG | SYSTOLIC BLOOD PRESSURE: 123 MMHG | TEMPERATURE: 98 F | RESPIRATION RATE: 16 BRPM

## 2023-09-08 LAB
APPEARANCE UR: ABNORMAL
BACTERIA # UR AUTO: ABNORMAL
BILIRUB UR-MCNC: ABNORMAL
COLOR SPEC: YELLOW — SIGNIFICANT CHANGE UP
DIFF PNL FLD: ABNORMAL
EPI CELLS # UR: SIGNIFICANT CHANGE UP
GLUCOSE UR QL: NEGATIVE MG/DL — SIGNIFICANT CHANGE UP
HCG UR QL: NEGATIVE — SIGNIFICANT CHANGE UP
KETONES UR-MCNC: ABNORMAL
LEUKOCYTE ESTERASE UR-ACNC: ABNORMAL
NITRITE UR-MCNC: NEGATIVE — SIGNIFICANT CHANGE UP
PH UR: 6 — SIGNIFICANT CHANGE UP (ref 5–8)
PROT UR-MCNC: 30 MG/DL
RBC CASTS # UR COMP ASSIST: ABNORMAL /HPF (ref 0–4)
SP GR SPEC: 1.02 — SIGNIFICANT CHANGE UP (ref 1.01–1.02)
UROBILINOGEN FLD QL: 4 MG/DL
WBC UR QL: ABNORMAL /HPF (ref 0–5)

## 2023-09-08 PROCEDURE — 99283 EMERGENCY DEPT VISIT LOW MDM: CPT

## 2023-09-08 PROCEDURE — 81025 URINE PREGNANCY TEST: CPT

## 2023-09-08 PROCEDURE — 81001 URINALYSIS AUTO W/SCOPE: CPT

## 2023-09-08 PROCEDURE — 87086 URINE CULTURE/COLONY COUNT: CPT

## 2023-09-08 RX ORDER — CEFPODOXIME PROXETIL 100 MG
1 TABLET ORAL
Qty: 14 | Refills: 0
Start: 2023-09-08 | End: 2023-09-14

## 2023-09-08 RX ORDER — CEFPODOXIME PROXETIL 100 MG
200 TABLET ORAL ONCE
Refills: 0 | Status: COMPLETED | OUTPATIENT
Start: 2023-09-08 | End: 2023-09-08

## 2023-09-08 RX ORDER — CYCLOBENZAPRINE HYDROCHLORIDE 10 MG/1
5 TABLET, FILM COATED ORAL ONCE
Refills: 0 | Status: DISCONTINUED | OUTPATIENT
Start: 2023-09-08 | End: 2023-09-15

## 2023-09-08 RX ADMIN — METHOCARBAMOL 1500 MILLIGRAM(S): 500 TABLET, FILM COATED ORAL at 00:14

## 2023-09-08 RX ADMIN — Medication 600 MILLIGRAM(S): at 00:13

## 2023-09-08 RX ADMIN — Medication 200 MILLIGRAM(S): at 02:13

## 2023-09-08 RX ADMIN — LIDOCAINE 1 PATCH: 4 CREAM TOPICAL at 00:14

## 2023-09-09 LAB
CULTURE RESULTS: SIGNIFICANT CHANGE UP
SPECIMEN SOURCE: SIGNIFICANT CHANGE UP

## 2024-05-17 NOTE — ED ADULT TRIAGE NOTE - TEMPERATURE IN FAHRENHEIT (DEGREES F)
99 Zehra Diamond is a 27 year old female here in clinic for GYN exam.      Concerns:  no    Last pap:  04/2023  Last mammogram: none  Contraception:  birth control    Denies known Latex allergy or symptoms of Latex sensitivity.  Medications verified, no changes.  Pcp, No    Health Maintenance Due   Topic Date Due    HPV Vaccine (1 - 3-dose series) Never done    DTaP/Tdap/Td Vaccine (7 - Td or Tdap) 06/30/2020    COVID-19 Vaccine (2 - 2023-24 season) 09/01/2023    Depression Screening  08/22/2024       Recent PHQ 2/9 Score    PHQ 2:  PHQ 2 Score Adult PHQ 2 Score Adult PHQ 2 Interpretation Little interest or pleasure in activity?   5/17/2024  11:54 AM 0 No further screening needed 0       PHQ 9:     PHQ-2/9 Depression Screening  Little interest or pleasure in activity?: Not at all  Feeling down, depressed or hopeless?: Not at all  Initial depression screening score:: 0  PHQ2 Interpretation: No further screening needed      Chaperone present during sensitive exam: No, patient declined chaperone.                                                                                                                                             Patient would like communication of their results via:    [facility]    Cell Phone:   Telephone Information:   Mobile [phone]     Okay to leave a message containing results? Yes

## 2024-11-12 NOTE — ED STATDOCS - OBJECTIVE STATEMENT
Negative
32 y/o female presents to the ED c/o intermittent epigastric abdominal pain for the past few weeks, was seen in the ED for similar 2 weeks ago. Pt also notes occasional headache as well. Pt has not been taking anything for her symptoms. Pt has not seen GI. Pt had 3 episodes of vomiting yesterday.  Pt also concerned about her lungs as well.     : Abelino

## 2025-05-20 NOTE — OB RN TRIAGE NOTE - YOU WERE IN THE HOSPITAL FOR:
Refill request received from pharmacy      Next Office Visit Date:  Future Appointments   Date Time Provider Department Center   6/17/2025  2:15 PM Antonieta Lester [facility] Please review via PDMP        Last Office Visit:    3/17/2025  
Rule out  labor